# Patient Record
Sex: MALE | Race: WHITE | NOT HISPANIC OR LATINO | Employment: FULL TIME | URBAN - METROPOLITAN AREA
[De-identification: names, ages, dates, MRNs, and addresses within clinical notes are randomized per-mention and may not be internally consistent; named-entity substitution may affect disease eponyms.]

---

## 2018-04-09 ENCOUNTER — OFFICE VISIT (OUTPATIENT)
Dept: URGENT CARE | Facility: CLINIC | Age: 63
End: 2018-04-09

## 2018-04-09 VITALS
OXYGEN SATURATION: 97 % | HEART RATE: 107 BPM | DIASTOLIC BLOOD PRESSURE: 72 MMHG | TEMPERATURE: 97 F | HEIGHT: 73 IN | SYSTOLIC BLOOD PRESSURE: 125 MMHG

## 2018-04-09 DIAGNOSIS — Z02.1 PHYSICAL EXAM, PRE-EMPLOYMENT: Primary | ICD-10-CM

## 2018-04-09 PROCEDURE — 86580 TB INTRADERMAL TEST: CPT | Performed by: NURSE PRACTITIONER

## 2018-04-09 NOTE — PROGRESS NOTES
[facility]        NAME: Magdy Galvez is a 66 y o  male  : 1955    MRN: [id number]  DATE: 2018  TIME: 11:31 AM    Assessment and Plan   Physical exam, pre-employment [Z02 1]  1  Physical exam, pre-employment           Patient Instructions     Patient Instructions   Normal physical- see form  TB test today  Call or return for problems/concerns      Chief Complaint     Chief Complaint   Patient presents with    Annual Exam     physical for work         History of Present Illness       Pre-employment physical-  for [employer]  No complaints/concerns        Review of Systems   Review of Systems   Constitutional: Negative for activity change, diaphoresis, fatigue and fever  HENT: Negative for congestion, facial swelling, hearing loss, rhinorrhea, sinus pain, sinus pressure, sneezing, sore throat and voice change  Eyes: Negative for discharge and visual disturbance  Respiratory: Negative for cough, choking, chest tightness, shortness of breath, wheezing and stridor  Cardiovascular: Negative for chest pain, palpitations and leg swelling  Gastrointestinal: Negative for abdominal distention, abdominal pain, constipation, diarrhea, nausea and vomiting  Endocrine: Negative for polydipsia, polyphagia and polyuria  Genitourinary: Negative for difficulty urinating, dysuria, frequency and urgency  Musculoskeletal: Negative for arthralgias, back pain, gait problem, joint swelling, myalgias, neck pain and neck stiffness  Skin: Negative for color change, rash and wound  Neurological: Negative for dizziness, syncope, speech difficulty, weakness, light-headedness and headaches  Hematological: Negative for adenopathy  Does not bruise/bleed easily  Psychiatric/Behavioral: Negative for agitation, behavioral problems, confusion, hallucinations, sleep disturbance and suicidal ideas  The patient is not nervous/anxious            Current Medications     No current outpatient prescriptions on file  Current Allergies     Allergies as of 04/09/2018    (No Known Allergies)            The following portions of the patient's history were reviewed and updated as appropriate: allergies, current medications, past family history, past medical history, past social history, past surgical history and problem list      No past medical history on file  No past surgical history on file  No family history on file  Medications have been verified  Objective   /72   Pulse (!) 107   Temp (!) 97 °F (36 1 °C)   Ht 6' 1" (1 854 m)   SpO2 97%        Physical Exam     Physical Exam   Constitutional: He is oriented to person, place, and time  He appears well-developed and well-nourished  No distress  HENT:   Head: Normocephalic and atraumatic  Right Ear: Tympanic membrane, external ear and ear canal normal    Left Ear: Tympanic membrane, external ear and ear canal normal    Nose: Nose normal  Right sinus exhibits no maxillary sinus tenderness and no frontal sinus tenderness  Left sinus exhibits no maxillary sinus tenderness and no frontal sinus tenderness  Mouth/Throat: Uvula is midline, oropharynx is clear and moist and mucous membranes are normal  No oropharyngeal exudate  Eyes: Conjunctivae and EOM are normal  Pupils are equal, round, and reactive to light  Right eye exhibits no discharge  Left eye exhibits no discharge  Neck: Normal range of motion  Neck supple  No tracheal deviation present  No thyromegaly present  Cardiovascular: Normal rate, regular rhythm and normal heart sounds  Exam reveals no gallop and no friction rub  No murmur heard  Pulmonary/Chest: Effort normal and breath sounds normal  No respiratory distress  He has no wheezes  He has no rales  Abdominal: Soft  Bowel sounds are normal  He exhibits no distension and no mass  There is no tenderness  There is no rebound and no guarding  Musculoskeletal: Normal range of motion   He exhibits no edema, tenderness or deformity  Lymphadenopathy:     He has no cervical adenopathy  Neurological: He is alert and oriented to person, place, and time  No cranial nerve deficit  Coordination normal    Skin: Skin is warm, dry and intact  No rash noted  He is not diaphoretic  No erythema  Psychiatric: He has a normal mood and affect  His speech is normal and behavior is normal  Judgment and thought content normal  Cognition and memory are normal    Nursing note and vitals reviewed

## 2020-07-31 RX ORDER — ASPIRIN 81 MG/1
81 TABLET ORAL DAILY
COMMUNITY
Start: 2020-06-26

## 2020-07-31 RX ORDER — SIMVASTATIN 40 MG
TABLET ORAL
COMMUNITY

## 2020-07-31 RX ORDER — ATORVASTATIN CALCIUM 80 MG/1
80 TABLET, FILM COATED ORAL DAILY
COMMUNITY
Start: 2020-06-26

## 2020-08-03 ENCOUNTER — OFFICE VISIT (OUTPATIENT)
Dept: UROLOGY | Facility: MEDICAL CENTER | Age: 65
End: 2020-08-03
Payer: COMMERCIAL

## 2020-08-03 VITALS
SYSTOLIC BLOOD PRESSURE: 120 MMHG | BODY MASS INDEX: 25.33 KG/M2 | HEIGHT: 72 IN | TEMPERATURE: 99.8 F | DIASTOLIC BLOOD PRESSURE: 80 MMHG | WEIGHT: 187 LBS | HEART RATE: 82 BPM

## 2020-08-03 DIAGNOSIS — N40.1 BPH WITH OBSTRUCTION/LOWER URINARY TRACT SYMPTOMS: ICD-10-CM

## 2020-08-03 DIAGNOSIS — N13.8 BPH WITH OBSTRUCTION/LOWER URINARY TRACT SYMPTOMS: ICD-10-CM

## 2020-08-03 DIAGNOSIS — R35.1 NOCTURIA: Primary | ICD-10-CM

## 2020-08-03 LAB — POST-VOID RESIDUAL VOLUME, ML POC: 6 ML

## 2020-08-03 PROCEDURE — 51798 US URINE CAPACITY MEASURE: CPT | Performed by: UROLOGY

## 2020-08-03 PROCEDURE — 99203 OFFICE O/P NEW LOW 30 MIN: CPT | Performed by: UROLOGY

## 2020-08-03 PROCEDURE — 81003 URINALYSIS AUTO W/O SCOPE: CPT | Performed by: UROLOGY

## 2020-08-03 NOTE — PROGRESS NOTES
Assessment/Plan:    BPH with obstruction/lower urinary tract symptoms  The patient's symptoms are probably due to BPH, but his urgency seems out of proportion  He may have a degree of overactive bladder  Cystoscopy scheduled  Diagnoses and all orders for this visit:    Nocturia    BPH with obstruction/lower urinary tract symptoms    Other orders  -     aspirin (Aspirin 81) 81 mg EC tablet; Take 81 mg by mouth daily  -     atorvastatin (LIPITOR) 80 mg tablet; Take 80 mg by mouth daily  -     metoprolol tartrate (LOPRESSOR) 25 mg tablet; Take 12 5 mg by mouth 2 (two) times a day  -     sertraline (ZOLOFT) 50 mg tablet; sertraline 50 mg tablet   TAKE 1 TABLET BY MOUTH EVERY DAY  -     simvastatin (ZOCOR) 40 mg tablet; simvastatin 40 mg tablet   TAKE 1 TABLET BY MOUTH EVERY EVENING          Subjective:      Patient ID: Braeden Schulz is a 63 y o  male  HPI  BPH:  He notes urinary urgency and nocturia x 2 or up to 4 times  He denies other significant urinary symptoms  He denies gross hematuria, urinary tract infections or incontinence  He is taking neither medications nor supplements for his symptoms  Feels a heaviness in his perineum  Symptomatic for about a year or a little longer  Urgency is major complaint  Knows all the BR's in his favorite stores, but does not carry a urinal   Never incontinent  Does not double void  PVR:  6 cc  PSA:  1 73 on July 24, 2019  AUA SYMPTOM SCORE      Most Recent Value   AUA SYMPTOM SCORE   How often have you had a sensation of not emptying your bladder completely after you finished urinating? 2   How often have you had to urinate again less than two hours after you finished urinating? 2   How often have you found you stopped and started again several times when you urinate? 1   How often have you found it difficult to postpone urination?   3   How often have you had a weak urinary stream?  1   How often have you had to push or strain to begin urination? 0   How many times did you most typically get up to urinate from the time you went to bed at night until the time you got up in the morning? 2   Quality of Life: If you were to spend the rest of your life with your urinary condition just the way it is now, how would you feel about that?  3   AUA SYMPTOM SCORE  11          The following portions of the patient's history were reviewed and updated as appropriate: allergies, current medications, past family history, past medical history, past social history, past surgical history and problem list     Review of Systems        Objective:      /80   Pulse 82   Temp 99 8 °F (37 7 °C)   Ht 6'   Wt 84 8 kg (187 lb)   BMI 25 36 kg/m²          Physical Exam   Constitutional: He is oriented to person, place, and time  He appears well-developed  HENT:   Head: Normocephalic and atraumatic  Neck: Normal range of motion  Neck supple  Pulmonary/Chest: Effort normal    Genitourinary:    Rectum normal       Genitourinary Comments: The prostate is 40 grams, firm, smooth and non-tender  Very broad and flat  Testes, scrotum and adnexa nl  Vas granulomas noted  No genital or perineal tenderness to account for heavy perineal feelings  Musculoskeletal: Normal range of motion  Neurological: He is alert and oriented to person, place, and time  Skin: Skin is warm and dry     Psychiatric: His behavior is normal  Judgment and thought content normal

## 2020-08-03 NOTE — ASSESSMENT & PLAN NOTE
The patient's symptoms are probably due to BPH, but his urgency seems out of proportion  He may have a degree of overactive bladder  Cystoscopy scheduled

## 2020-08-25 ENCOUNTER — PROCEDURE VISIT (OUTPATIENT)
Dept: UROLOGY | Facility: MEDICAL CENTER | Age: 65
End: 2020-08-25
Payer: COMMERCIAL

## 2020-08-25 VITALS
DIASTOLIC BLOOD PRESSURE: 80 MMHG | BODY MASS INDEX: 25.33 KG/M2 | WEIGHT: 187 LBS | SYSTOLIC BLOOD PRESSURE: 120 MMHG | HEART RATE: 87 BPM | TEMPERATURE: 98.6 F | HEIGHT: 72 IN

## 2020-08-25 DIAGNOSIS — N32.81 OAB (OVERACTIVE BLADDER): Primary | ICD-10-CM

## 2020-08-25 DIAGNOSIS — N40.1 BPH WITH OBSTRUCTION/LOWER URINARY TRACT SYMPTOMS: ICD-10-CM

## 2020-08-25 DIAGNOSIS — N13.8 BPH WITH OBSTRUCTION/LOWER URINARY TRACT SYMPTOMS: ICD-10-CM

## 2020-08-25 PROCEDURE — 99214 OFFICE O/P EST MOD 30 MIN: CPT | Performed by: UROLOGY

## 2020-08-25 PROCEDURE — 81003 URINALYSIS AUTO W/O SCOPE: CPT | Performed by: UROLOGY

## 2020-08-25 PROCEDURE — 52000 CYSTOURETHROSCOPY: CPT | Performed by: UROLOGY

## 2020-08-25 RX ORDER — CLOPIDOGREL BISULFATE 75 MG/1
TABLET ORAL
COMMUNITY
Start: 2020-08-19

## 2020-08-25 RX ORDER — OXYBUTYNIN CHLORIDE 5 MG/1
5 TABLET ORAL 3 TIMES DAILY PRN
Qty: 90 TABLET | Refills: 1 | Status: SHIPPED | OUTPATIENT
Start: 2020-08-25

## 2020-08-25 NOTE — PROGRESS NOTES
Assessment/Plan:    OAB (overactive bladder)  The patient's primary complaints are urgency, nocturia  He carries a minimal residual and his prostate is not impressive  I think overactive bladder is more likely than BPH, although he does have some BPH as well  Trial of oxybutynin  BPH with obstruction/lower urinary tract symptoms  BPH noted on physical exam previously an on today's cystoscopy but I do not think it is the patient's primary problem  Diagnoses and all orders for this visit:    OAB (overactive bladder)  -     oxybutynin (DITROPAN) 5 mg tablet; Take 1 tablet (5 mg total) by mouth 3 (three) times a day as needed (urinary frequency)    BPH with obstruction/lower urinary tract symptoms    Other orders  -     clopidogrel (PLAVIX) 75 mg tablet          Subjective:      Patient ID: Camilo Cross is a 63 y o  male  HPI  BPH with obstruction versus overactive bladder:  The patient complains of about 1 year of urinary urgency  He knows all the bathrooms in his favorite [employer], but does not have to carry a urinal in the car  He has not had any episodes of incontinence  In addition to the urgency, he has nocturia 2-4 times per night  He also describes a heaviness in his perineum  AUA score  on August 3, 2020 was 11 and his quality of life was mixed  AUA SYMPTOM SCORE      Most Recent Value   AUA SYMPTOM SCORE   How often have you had a sensation of not emptying your bladder completely after you finished urinating? 3   How often have you had to urinate again less than two hours after you finished urinating? 3   How often have you found you stopped and started again several times when you urinate? 2   How often have you found it difficult to postpone urination? 3   How often have you had a weak urinary stream?  2   How often have you had to push or strain to begin urination?   2   How many times did you most typically get up to urinate from the time you went to bed at night until the time you got up in the morning? 2   Quality of Life: If you were to spend the rest of your life with your urinary condition just the way it is now, how would you feel about that?  3   AUA SYMPTOM SCORE  17            The patient returns today for cystoscopy  Procedures      MALE FLEXIBLE CYSTOSCOPY    Preoperative diagnosis:  BPH versus overactive bladder    Postoperative diagnosis:  Mild BPH, symptoms more likely due to overactive bladder  Operation:  Flexible cystoscopy    Surgeon:  Bernard Pineda MD,FACS    Anesthesia:  Xylocaine jelly    Procedure:  Patient was brought to the cystoscopy room and positively identified  The patient was prepped and draped in sterile fashion  Ten mL of 1% xylocaine jelly was instilled into the urethra  A penile clamp was applied  The flexible cystoscope was inserted  Findings are as follows:    Penile Urethra:  normal  Bulbar Urethra:  normal  Prostate:  Occlusive for approximately 1 5 cm at the bladder neck  The prostate bulges into the bladder, particularly anteriorly  Bladder:   Bladder Neck:  Some distortion by prolapsing anterior prosthetic tissue as mentioned above  Ureteral orifices:   Normal  Mucosa:   Normal     Trabeculation:  moderate  PVR:  Small  Other findings: The cystoscope was removed  The patient tolerated the procedure well  Following additional consultation to review the findings with the patient, he was discharged from the office in satisfactory condition  Impression:  BPH with some obstructive changes  Most men would void normally with this prostate  Postvoid residual is minimal   The patient's primary complaint is urgency and I think this is more likely due to overactive bladder than to the mild BPH findings        The following portions of the patient's history were reviewed and updated as appropriate: allergies, current medications, past family history, past medical history, past social history, past surgical history and problem list     Review of Systems   Constitutional: Negative for activity change and fatigue  Respiratory: Negative for shortness of breath and wheezing  Cardiovascular: Negative for chest pain  History of myocardial infarction and abdominal aortic aneurysm  Taking clopidogrel  Gastrointestinal: Negative for abdominal pain  Genitourinary: Negative for difficulty urinating, dysuria, frequency, hematuria and urgency  Musculoskeletal: Negative for back pain and gait problem  Skin: Negative  Allergic/Immunologic: Negative  Neurological: Negative  Psychiatric/Behavioral: Negative  Depression on sertraline  Objective:      /80   Pulse 87   Temp 98 6 °F (37 °C)   Ht 6' (1 829 m)   Wt 84 8 kg (187 lb)   BMI 25 36 kg/m²          Physical Exam  Constitutional:       Appearance: He is well-developed  HENT:      Head: Normocephalic and atraumatic  Neck:      Musculoskeletal: Normal range of motion  Pulmonary:      Effort: Pulmonary effort is normal    Genitourinary:     Penis: Normal     Musculoskeletal: Normal range of motion  Skin:     General: Skin is warm and dry  Neurological:      Mental Status: He is alert and oriented to person, place, and time  Psychiatric:         Behavior: Behavior normal          Thought Content:  Thought content normal          Judgment: Judgment normal

## 2020-08-25 NOTE — LETTER
August 25, 2020     Rodney Trejo, [address]  [address] [address]    Patient: Tad Paulson   YOB: 1955   Date of Visit: 8/25/2020       Dear Dr Elkin Thacker: Thank you for referring Jai Wood to me for evaluation  Below are my notes for this consultation  If you have questions, please do not hesitate to call me  I look forward to following your patient along with you  Sincerely,        Colby Albert MD        CC: No Recipients  Colby Albert MD  8/25/2020 12:57 PM  Incomplete  Assessment/Plan:    OAB (overactive bladder)  The patient's primary complaints are urgency, nocturia  He carries a minimal residual and his prostate is not impressive  I think overactive bladder is more likely than BPH, although he does have some BPH as well  Trial of oxybutynin  BPH with obstruction/lower urinary tract symptoms  BPH noted on physical exam previously an on today's cystoscopy but I do not think it is the patient's primary problem  Diagnoses and all orders for this visit:    OAB (overactive bladder)  -     oxybutynin (DITROPAN) 5 mg tablet; Take 1 tablet (5 mg total) by mouth 3 (three) times a day as needed (urinary frequency)    BPH with obstruction/lower urinary tract symptoms    Other orders  -     clopidogrel (PLAVIX) 75 mg tablet          Subjective:      Patient ID: Tad Paulson is a 63 y o  male  HPI  BPH with obstruction versus overactive bladder:  The patient complains of about 1 year of urinary urgency  He knows all the bathrooms in his favorite [employer], but does not have to carry a urinal in the car  He has not had any episodes of incontinence  In addition to the urgency, he has nocturia 2-4 times per night  He also describes a heaviness in his perineum  AUA score  on August 3, 2020 was 11 and his quality of life was mixed        AUA SYMPTOM SCORE      Most Recent Value   AUA SYMPTOM SCORE   How often have you had a sensation of not emptying your bladder completely after you finished urinating? 3   How often have you had to urinate again less than two hours after you finished urinating? 3   How often have you found you stopped and started again several times when you urinate? 2   How often have you found it difficult to postpone urination? 3   How often have you had a weak urinary stream?  2   How often have you had to push or strain to begin urination? 2   How many times did you most typically get up to urinate from the time you went to bed at night until the time you got up in the morning? 2   Quality of Life: If you were to spend the rest of your life with your urinary condition just the way it is now, how would you feel about that?  3   AUA SYMPTOM SCORE  17            The patient returns today for cystoscopy  Procedures      MALE FLEXIBLE CYSTOSCOPY    Preoperative diagnosis:  BPH versus overactive bladder    Postoperative diagnosis:  Mild BPH, symptoms more likely due to overactive bladder  Operation:  Flexible cystoscopy    Surgeon:  Jarvis Wisdom MD,FACS    Anesthesia:  Xylocaine jelly    Procedure:  Patient was brought to the cystoscopy room and positively identified  The patient was prepped and draped in sterile fashion  Ten mL of 1% xylocaine jelly was instilled into the urethra  A penile clamp was applied  The flexible cystoscope was inserted  Findings are as follows:    Penile Urethra:  normal  Bulbar Urethra:  normal  Prostate:  Occlusive for approximately 1 5 cm at the bladder neck  The prostate bulges into the bladder, particularly anteriorly  Bladder:   Bladder Neck:  Some distortion by prolapsing anterior prosthetic tissue as mentioned above  Ureteral orifices:   Normal  Mucosa:   Normal     Trabeculation:  moderate  PVR:  Small  Other findings: The cystoscope was removed  The patient tolerated the procedure well    Following additional consultation to review the findings with the patient, he was discharged from the office in satisfactory condition  Impression:  BPH with some obstructive changes  Most men would void normally with this prostate  Postvoid residual is minimal   The patient's primary complaint is urgency and I think this is more likely due to overactive bladder than to the mild BPH findings  The following portions of the patient's history were reviewed and updated as appropriate: allergies, current medications, past family history, past medical history, past social history, past surgical history and problem list     Review of Systems   Constitutional: Negative for activity change and fatigue  Respiratory: Negative for shortness of breath and wheezing  Cardiovascular: Negative for chest pain  History of myocardial infarction and abdominal aortic aneurysm  Taking clopidogrel  Gastrointestinal: Negative for abdominal pain  Genitourinary: Negative for difficulty urinating, dysuria, frequency, hematuria and urgency  Musculoskeletal: Negative for back pain and gait problem  Skin: Negative  Allergic/Immunologic: Negative  Neurological: Negative  Psychiatric/Behavioral: Negative  Depression on sertraline  Objective:      /80   Pulse 87   Temp 98 6 °F (37 °C)   Ht 6' (1 829 m)   Wt 84 8 kg (187 lb)   BMI 25 36 kg/m²          Physical Exam  Constitutional:       Appearance: He is well-developed  HENT:      Head: Normocephalic and atraumatic  Neck:      Musculoskeletal: Normal range of motion  Pulmonary:      Effort: Pulmonary effort is normal    Genitourinary:     Penis: Normal     Musculoskeletal: Normal range of motion  Skin:     General: Skin is warm and dry  Neurological:      Mental Status: He is alert and oriented to person, place, and time  Psychiatric:         Behavior: Behavior normal          Thought Content:  Thought content normal          Judgment: Judgment normal            Cynthia Cruz MD 8/25/2020  9:42 AM  Sign when Signing Visit  Assessment/Plan:    No problem-specific Assessment & Plan notes found for this encounter  There are no diagnoses linked to this encounter  Subjective:      Patient ID: Mt Shah is a 63 y o  male  HPI  BPH with obstruction versus overactive bladder:  The patient complains of about 1 year of urinary urgency  He knows all the bathrooms in his favorite [employer], but does not have to carry a urinal in the car  He has not had any episodes of incontinence  In addition to the urgency, he has nocturia 2-4 times per night  He also describes a heaviness in his perineum  AUA score  on August 3, 2020 was 11 and his quality of life was mixed  AUA SYMPTOM SCORE      Most Recent Value   AUA SYMPTOM SCORE   How often have you had a sensation of not emptying your bladder completely after you finished urinating? 3   How often have you had to urinate again less than two hours after you finished urinating? 3   How often have you found you stopped and started again several times when you urinate? 2   How often have you found it difficult to postpone urination? 3   How often have you had a weak urinary stream?  2   How often have you had to push or strain to begin urination? 2   How many times did you most typically get up to urinate from the time you went to bed at night until the time you got up in the morning? 2   Quality of Life: If you were to spend the rest of your life with your urinary condition just the way it is now, how would you feel about that?  3   AUA SYMPTOM SCORE  17            The patient returns today for cystoscopy  Procedures      MALE FLEXIBLE CYSTOSCOPY    Preoperative diagnosis:  BPH versus overactive bladder    Postoperative diagnosis:  Mild BPH, symptoms more likely due to overactive bladder    Operation:  Flexible cystoscopy    Surgeon:  Declan Akins MD,FACS    Anesthesia:  Xylocaine jelly    Procedure:  Patient was brought to the cystoscopy room and positively identified  The patient was prepped and draped in sterile fashion  Ten mL of 1% xylocaine jelly was instilled into the urethra  A penile clamp was applied  The flexible cystoscope was inserted  Findings are as follows:    Penile Urethra:  normal  Bulbar Urethra:  normal  Prostate:  Occlusive for approximately 1 5 cm at the bladder neck  The prostate bulges into the bladder, particularly anteriorly  Bladder:   Bladder Neck:  Some distortion by prolapsing anterior prosthetic tissue as mentioned above  Ureteral orifices:   Normal  Mucosa:   Normal     Trabeculation:  moderate  PVR:  Small  Other findings: The cystoscope was removed  The patient tolerated the procedure well  Following additional consultation to review the findings with the patient, he was discharged from the office in satisfactory condition  Impression:  BPH with some obstructive changes  Most men would void normally with this prostate  Postvoid residual is minimal   The patient's primary complaint is urgency and I think this is more likely due to overactive bladder than to the mild BPH findings  The following portions of the patient's history were reviewed and updated as appropriate: allergies, current medications, past family history, past medical history, past social history, past surgical history and problem list     Review of Systems   Constitutional: Negative for activity change and fatigue  Respiratory: Negative for shortness of breath and wheezing  Cardiovascular: Negative for chest pain  History of myocardial infarction and abdominal aortic aneurysm  Taking clopidogrel  Gastrointestinal: Negative for abdominal pain  Genitourinary: Negative for difficulty urinating, dysuria, frequency, hematuria and urgency  Musculoskeletal: Negative for back pain and gait problem  Skin: Negative  Allergic/Immunologic: Negative  Neurological: Negative  Psychiatric/Behavioral: Negative  Depression on sertraline  Objective:      /80   Pulse 87   Temp 98 6 °F (37 °C)   Ht 6' (1 829 m)   Wt 84 8 kg (187 lb)   BMI 25 36 kg/m²          Physical Exam  Constitutional:       Appearance: He is well-developed  HENT:      Head: Normocephalic and atraumatic  Neck:      Musculoskeletal: Normal range of motion  Pulmonary:      Effort: Pulmonary effort is normal    Genitourinary:     Penis: Normal     Musculoskeletal: Normal range of motion  Skin:     General: Skin is warm and dry  Neurological:      Mental Status: He is alert and oriented to person, place, and time  Psychiatric:         Behavior: Behavior normal          Thought Content:  Thought content normal          Judgment: Judgment normal

## 2020-08-25 NOTE — ASSESSMENT & PLAN NOTE
BPH noted on physical exam previously an on today's cystoscopy but I do not think it is the patient's primary problem

## 2020-08-25 NOTE — ASSESSMENT & PLAN NOTE
The patient's primary complaints are urgency, nocturia  He carries a minimal residual and his prostate is not impressive  I think overactive bladder is more likely than BPH, although he does have some BPH as well  Trial of oxybutynin

## 2021-11-28 ENCOUNTER — OFFICE VISIT (OUTPATIENT)
Dept: URGENT CARE | Facility: CLINIC | Age: 66
End: 2021-11-28
Payer: COMMERCIAL

## 2021-11-28 VITALS
WEIGHT: 180 LBS | OXYGEN SATURATION: 98 % | HEART RATE: 73 BPM | BODY MASS INDEX: 24.38 KG/M2 | TEMPERATURE: 98.1 F | RESPIRATION RATE: 16 BRPM | HEIGHT: 72 IN

## 2021-11-28 DIAGNOSIS — B34.9 VIRAL ILLNESS: Primary | ICD-10-CM

## 2021-11-28 PROCEDURE — U0003 INFECTIOUS AGENT DETECTION BY NUCLEIC ACID (DNA OR RNA); SEVERE ACUTE RESPIRATORY SYNDROME CORONAVIRUS 2 (SARS-COV-2) (CORONAVIRUS DISEASE [COVID-19]), AMPLIFIED PROBE TECHNIQUE, MAKING USE OF HIGH THROUGHPUT TECHNOLOGIES AS DESCRIBED BY CMS-2020-01-R: HCPCS | Performed by: PHYSICIAN ASSISTANT

## 2021-11-28 PROCEDURE — U0005 INFEC AGEN DETEC AMPLI PROBE: HCPCS | Performed by: PHYSICIAN ASSISTANT

## 2021-11-30 LAB — SARS-COV-2 RNA RESP QL NAA+PROBE: POSITIVE
